# Patient Record
(demographics unavailable — no encounter records)

---

## 2025-05-08 NOTE — HISTORY OF PRESENT ILLNESS
[FreeTextEntry1] :  Mr. CASTLE is a 54 year old male with  past medical history of HTN, HLD, Family Hx of premature CAD (Dad had CABG in his 50s) with complaints of  occasional mild SOB when carrying up something up the stairs but states he can do 9K steps a day and run up stairs without difficulty. He went to [facility] to visit and went for routine calcium score which was elevated. He then followed with his cardiologist in [place].  Patient underwent routine cardiac testing with CCTA reveals MVD.    Given significant risk factors, mild BARR symptoms not otherwise explained, and evidence of LM/pLAD, he underwent cardiac catheterization on 5/5/25 which revealed Left main artery: Angiography shows severe atherosclerosis. dampened waveforms. There is a 70 % stenosis. DERICK Flow 3. Left anterior descending artery: Angiography shows diffuse mild atherosclerosis. aneurysmal proximal segment. There is a 90 % stenosis in the distal third portion of the segment. DERICK Flow 3.  Circumflex: There is an 80 % stenosis in the proximal third portion of the segment. DERIKC Flow 3. Right coronary artery: Angiography shows moderate atherosclerosis. mild-moderate diffuse disease, aneurysmal segments in proximal and distal portions. DERICK Flow 3.  He is referred by Dr. Arun Radford/ Dr. Rusty Leal for initial evaluation and management for CAD. Denies any chest pain, palpitations, dizziness or pedal edema.   Of note, patient is premedicated in the setting of "Shellfish" allergy to be safe.     NYHA class I

## 2025-05-08 NOTE — END OF VISIT
[FreeTextEntry3] :  I, ASHLEY Monteiro , personally performed the evaluation and management (E/M) services for this new  patient. That E/M includes conducting the initial examination, assessing all conditions, and establishing the plan of care. Today, FIGUEROA MAYS  was here to observe my evaluation and management services for this patient.

## 2025-05-08 NOTE — PHYSICAL EXAM
[General Appearance - Alert] : alert [General Appearance - In No Acute Distress] : in no acute distress [General Appearance - Well Nourished] : well nourished [General Appearance - Well Developed] : well developed [Sclera] : the sclera and conjunctiva were normal [PERRL With Normal Accommodation] : pupils were equal in size, round, and reactive to light [Outer Ear] : the ears and nose were normal in appearance [Hearing Threshold Finger Rub Not [place]] : hearing was normal [Both Tympanic Membranes Were Examined] : both tympanic membranes were normal [Neck Appearance] : the appearance of the neck was normal [] : no respiratory distress [Respiration, Rhythm And Depth] : normal respiratory rhythm and effort [Auscultation Breath Sounds / Voice Sounds] : lungs were clear to auscultation bilaterally [Apical Impulse] : the apical impulse was normal [Heart Rate And Rhythm] : heart rate was normal and rhythm regular [Heart Sounds] : normal S1 and S2 [Murmurs] : no murmurs [Examination Of The Chest] : the chest was normal in appearance [2+] : left 2+ [Breast Appearance] : normal in appearance [Bowel Sounds] : normal bowel sounds [Abdomen Soft] : soft [No CVA Tenderness] : no ~M costovertebral angle tenderness [Abnormal Walk] : normal gait [Involuntary Movements] : no involuntary movements were seen [Skin Color & Pigmentation] : normal skin color and pigmentation [Skin Turgor] : normal skin turgor [No Focal Deficits] : no focal deficits [Oriented To Time, Place, And Person] : oriented to person, place, and time [Impaired Insight] : insight and judgment were intact [Affect] : the affect was normal [Mood] : the mood was normal [Memory Recent] : recent memory was not impaired [Memory Remote] : remote memory was not impaired [FreeTextEntry1] : Deferred

## 2025-05-08 NOTE — CONSULT LETTER
[Dear  ___] : Dear  [unfilled], [Courtesy Letter:] : I had the pleasure of seeing your patient, [unfilled], in my office today. [Please see my note below.] : Please see my note below. [Consult Closing:] : Thank you very much for allowing me to participate in the care of this patient.  If you have any questions, please do not hesitate to contact me. [Sincerely,] : Sincerely, [FreeTextEntry2] : Dr. Arun Radford/ Dr. Rusty Leal [FreeTextEntry3] :  Dr. Cabrera Fregoso Attending Surgeon Department of Cardiovascular and Thoracic surgery [address] [place] [address] Tel: [phone]

## 2025-05-08 NOTE — ASSESSMENT
[FreeTextEntry1] : Mr. CASTLE is a 54 year old male with  past medical history of HTN, HLD, Family Hx of premature CAD (Dad had CABG in his 50s) with complaints of  occasional mild SOB when carrying up something up the stairs but states he can do 9K steps a day and run up stairs without difficulty. He went to [facility] to visit and went for routine calcium score which was elevated. He then followed with his cardiologist in [place].  Patient underwent routine cardiac testing with CCTA reveals MVD.    Given significant risk factors, mild BARR symptoms not otherwise explained, and evidence of LM/pLAD, he underwent cardiac catheterization on 5/5/25 which revealed Left main artery: Angiography shows severe atherosclerosis. dampened waveforms. There is a 70 % stenosis. DERICK Flow 3. Left anterior descending artery: Angiography shows diffuse mild atherosclerosis. aneurysmal proximal segment. There is a 90 % stenosis in the distal third portion of the segment. DERICK Flow 3.  Circumflex: There is an 80 % stenosis in the proximal third portion of the segment. DERICK Flow 3. Right coronary artery: Angiography shows moderate atherosclerosis. mild-moderate diffuse disease, aneurysmal segments in proximal and distal portions. DERICK Flow 3.  He is referred by Dr. Arun Radford/ Dr. Rusty Leal for initial evaluation and management for CAD. Denies any chest pain, palpitations, dizziness or pedal edema.       Dr. Cabrera Fregoso reviewed the cardiac imaging, medical records and reports with patient and discussed the case.  TTE 9/10/24 - Normal LV structure and function, LVEF 62%, mild MR, mild TR   CCTA on 4/7/25 - Calcium score 1951, hemodynamically significant stenosis identified in the left main, LAD, and circumflex. FFR: LAD (0.76, 0.58), LCx: (0.74, 0.61), RCA not available due to artifcact  5/5/25 Cardiac catheterization revealed Left main artery: Angiography shows severe atherosclerosis. dampened waveforms. There is a 70 % stenosis. DERICK Flow 3. Left anterior descending artery: Angiography shows diffuse mild atherosclerosis. aneurysmal proximal segment. There is a 90 % stenosis in the distal third portion of the segment. DERICK Flow 3.  Circumflex: There is an 80 % stenosis in the proximal third portion of the segment. DERICK Flow 3. Right coronary artery: Angiography shows moderate atherosclerosis. mild-moderate diffuse disease, aneurysmal segments in proximal and distal portions. DERICK Flow 3.    Dr. Cabrera Fregoso discussed the risks , benefits and alternatives to surgery. Risks included but not limited to  bleeding , stroke, Myocardial Infarction, kidney problems,Blood transfusion ,permanent  pacemaker implantation,  infections and death. Dr. Cabrera Fregoso  quoted a (low ) operative mortality and complication risks. Dr. Cabrera Fregoso also discussed the various approaches in detail. Dr. Cabrera Fregoso  feel that the patient will benefit and is a candidate for a Coronary artery bypass grafting . All questions and concerns were addressed and patient agrees to proceed with  the plan.     **************************************************************************************************************************************************************************************************************************************************************************************************************************************************************************************************************************************************************************************************************************************************************************************************************************************************************************************************************************************************************************************************************************************************************************************************************************************************************************************************************************************************************************************************************************************************************************************************************************************************************************************************************************************************************    54M h/o HTN, HDL, premature CAD [Dad had CABG in his 50s] presented for elective LHC following positive CCTA to work up mild exertional dyspnea. LHC revealed 70% left main disease, severe proximal circumflex disease as well as distal LAD disease. Currently denies chest / back pain at rest or exertion, does describe some mild exertional dyspnea with strenuous activity. Occasional cigar smoker; owns cigar lounge, w/second-hand smoke incidence. 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  - Patient recommendation for surgical revascularization, likely CABG x2 [LIMA-LAD, radial-OM].   1. Patient will discuss with wife and decide between surgery and PCI.   2. If surgery, will require PSTs and carotids  3. Should continue ASA and Toprol.  4. Should continue to follow up with his cardiologist [Dr. Newell, Dr. Leal] as well as PCP.   5. Told him to avoid heavy strenuous activity e.g jogging etc.

## 2025-05-08 NOTE — DATA REVIEWED
[FreeTextEntry1] : TTE 9/10/24 - Normal LV structure and function, LVEF 62%, mild MR, mild TR   CCTA on 4/7/25 - Calcium score 1951, hemodynamically significant stenosis identified in the left main, LAD, and circumflex. FFR: LAD (0.76, 0.58), LCx: (0.74, 0.61), RCA not available due to artifcact  5/5/25 Cardiac catheterization revealed Left main artery: Angiography shows severe atherosclerosis. dampened waveforms. There is a 70 % stenosis. DERICK Flow 3. Left anterior descending artery: Angiography shows diffuse mild atherosclerosis. aneurysmal proximal segment. There is a 90 % stenosis in the distal third portion of the segment. DERICK Flow 3.  Circumflex: There is an 80 % stenosis in the proximal third portion of the segment. DERICK Flow 3. Right coronary artery: Angiography shows moderate atherosclerosis. mild-moderate diffuse disease, aneurysmal segments in proximal and distal portions. DERICK Flow 3.